# Patient Record
Sex: FEMALE | Race: WHITE
[De-identification: names, ages, dates, MRNs, and addresses within clinical notes are randomized per-mention and may not be internally consistent; named-entity substitution may affect disease eponyms.]

---

## 2017-06-26 ENCOUNTER — HOSPITAL ENCOUNTER (OUTPATIENT)
Dept: HOSPITAL 8 - CFH | Age: 32
Discharge: HOME | End: 2017-06-26
Attending: INTERNAL MEDICINE
Payer: OTHER GOVERNMENT

## 2017-06-26 DIAGNOSIS — C49.21: Primary | ICD-10-CM

## 2017-06-26 PROCEDURE — 71250 CT THORAX DX C-: CPT

## 2017-12-11 ENCOUNTER — HOSPITAL ENCOUNTER (OUTPATIENT)
Dept: HOSPITAL 8 - CFH | Age: 32
Discharge: HOME | End: 2017-12-11
Attending: INTERNAL MEDICINE
Payer: OTHER GOVERNMENT

## 2017-12-11 DIAGNOSIS — C49.21: Primary | ICD-10-CM

## 2017-12-11 PROCEDURE — 71260 CT THORAX DX C+: CPT

## 2018-11-13 ENCOUNTER — HOSPITAL ENCOUNTER (OUTPATIENT)
Dept: HOSPITAL 8 - CFH | Age: 33
Discharge: HOME | End: 2018-11-13
Attending: INTERNAL MEDICINE
Payer: OTHER GOVERNMENT

## 2018-11-13 DIAGNOSIS — G57.81: Primary | ICD-10-CM

## 2018-11-13 PROCEDURE — 73723 MRI JOINT LWR EXTR W/O&W/DYE: CPT

## 2018-11-13 PROCEDURE — A9585 GADOBUTROL INJECTION: HCPCS

## 2018-11-15 ENCOUNTER — HOSPITAL ENCOUNTER (OUTPATIENT)
Dept: HOSPITAL 8 - RAD | Age: 33
Discharge: HOME | End: 2018-11-15
Attending: INTERNAL MEDICINE
Payer: OTHER GOVERNMENT

## 2018-11-15 DIAGNOSIS — C49.21: Primary | ICD-10-CM

## 2018-11-15 PROCEDURE — 71260 CT THORAX DX C+: CPT

## 2019-12-12 ENCOUNTER — INITIAL PRENATAL (OUTPATIENT)
Dept: OBGYN | Facility: CLINIC | Age: 34
End: 2019-12-12
Payer: OTHER GOVERNMENT

## 2019-12-12 DIAGNOSIS — Z34.82 ENCOUNTER FOR SUPERVISION OF OTHER NORMAL PREGNANCY IN SECOND TRIMESTER: ICD-10-CM

## 2019-12-12 DIAGNOSIS — Z85.831 HISTORY OF SARCOMA: ICD-10-CM

## 2019-12-12 DIAGNOSIS — Z3A.21 21 WEEKS GESTATION OF PREGNANCY: ICD-10-CM

## 2019-12-12 PROCEDURE — 59401 PR NEW OB VISIT: CPT | Performed by: OBSTETRICS & GYNECOLOGY

## 2019-12-12 RX ORDER — MULTIVIT WITH MINERALS/LUTEIN
TABLET ORAL
COMMUNITY
End: 2019-12-12

## 2019-12-12 RX ORDER — FEXOFENADINE HCL 180 MG/1
TABLET ORAL
COMMUNITY
End: 2019-12-12

## 2019-12-12 NOTE — PROGRESS NOTES
Cc: New OB visit    Ms. Fong is a 34 y.o.  at 21w4d based upon 10w1d c/w  Patient's last menstrual period was 2019.  Who presents as transfer of care.  Reports she has been doing well and hasn't had any complications this pregnancy.   Has had routine care since 10w1d at which time she had an US.She had first trimester screening, all her labs, second trimester screening, and anatomy ultrasound all of which were normal.  Moved here from [place].   is a [place] and [place] job in [place].  No issues during this pregnancy except severe nausea for which she has been taking reglan which helps.  Denies any other complaints today.  Feels lots of FM, no cramps, VB, LOF.    Dating:early ultrasound    OB History    Para Term  AB Living   2 1 1     1   SAB TAB Ectopic Molar Multiple Live Births             1      # Outcome Date GA Lbr Jorge/2nd Weight Sex Delivery Anes PTL Lv   2 Current            1 Term 18 39w0d  3.175 kg (7 lb) F Vag-Spont      G1 - low PAPPA  So monitored for that.  Went into PTL at 31wks with that pregnancy but made it to 39 wks at which time she was induced for the low PAPPA - baby doing well    Past Medical History:   Diagnosis Date   • Infectious disease    --h/o sarcomas, has routine chest Xrays every 6 months after removal of sarcome in right popliteal fossa in     Past Surgical History:   Procedure Laterality Date   • MASS EXCISION GENERAL Right 2016    Procedure: MASS EXCISION GENERAL of  popliteal darius ;  Surgeon: Aylin Early M.D.;  Location: SURGERY [facility];  Service:    • PB REMOVAL OF TONSILS,<13 Y/O     • MEDIAL PATELLOFEMORAL LIGAMENT       Social History     Socioeconomic History   • Marital status:      Spouse name: Not on file   • Number of children: Not on file   • Years of education: Not on file   • Highest education level: Not on file   Occupational History   • Not on file   Social Needs   • Financial resource  strain: Not on file   • Food insecurity:     Worry: Not on file     Inability: Not on file   • Transportation needs:     Medical: Not on file     Non-medical: Not on file   Tobacco Use   • Smoking status: Never Smoker   • Smokeless tobacco: Never Used   Substance and Sexual Activity   • Alcohol use: No   • Drug use: No   • Sexual activity: Not on file   Lifestyle   • Physical activity:     Days per week: Not on file     Minutes per session: Not on file   • Stress: Not on file   Relationships   • Social connections:     Talks on phone: Not on file     Gets together: Not on file     Attends Gnosticist service: Not on file     Active member of club or organization: Not on file     Attends meetings of clubs or organizations: Not on file     Relationship status: Not on file   • Intimate partner violence:     Fear of current or ex partner: Not on file     Emotionally abused: Not on file     Physically abused: Not on file     Forced sexual activity: Not on file   Other Topics Concern   • Not on file   Social History Narrative   • Not on file     Family History   Problem Relation Age of Onset   • Heart Disease Mother    • No Known Problems Father    • No Known Problems Brother      Allergies:   Allergies as of 2019 - Reviewed 2019   Allergen Reaction Noted   • Acetaminophen Vomiting 2016   • Pcn [penicillins] Rash 2016     PE:    There were no vitals taken for this visit.    General: well developed, well nourished in no apparent distress  Head: normocephalic, atraumatic  Neck: neck is supple, non-tender, no thyromegaly, full range of motion  CVS: 2+ distal pulses, no peripheral edema  Lungs: Normal respiratory effort.   Abdomen: FH at umbilicus, FHT 160s, nondistended, soft, nontender x4, no rebound or guarding.  Skin: No rashes, or ulcers or lesions seen  Psychiatric: appropriate affect, is alert and oriented x3, intact judgment and insight.    A/P:  34 y.o.  @ 21w4d by 10wk US here for her new  obstetric visit as transfer of care    1. 21 weeks gestation of pregnancy     2. Encounter for supervision of other normal pregnancy in second trimester     3. History of sarcoma         #Prenatal care.  Patient was oriented to our obstetric practice, group model, and frequency of visits is discussed.  She is encouraged to continue prenatal vitamin use.  We reviewed safe foods and appropraite exercise during pregnancy.  # Reviewed measures for nausea and vomiting.  Patient happy with reglan use PRN.   #Records release signed today as she has had complete care and so we will obtain these records  --will need CBC/RPR/GTT ordered at next visit   #Close interval pregnancy.  First child will be 18 months at time of this delivery.  Discussed higher risk or PTL.    #NOB packet given  #SAB precautions reviewed.  #Return to clinic in 4 weeks.  KEE

## 2020-01-09 ENCOUNTER — ROUTINE PRENATAL (OUTPATIENT)
Dept: OBGYN | Facility: CLINIC | Age: 35
End: 2020-01-09
Payer: OTHER GOVERNMENT

## 2020-01-09 VITALS — WEIGHT: 181 LBS | SYSTOLIC BLOOD PRESSURE: 128 MMHG | DIASTOLIC BLOOD PRESSURE: 71 MMHG | BODY MASS INDEX: 27.93 KG/M2

## 2020-01-09 DIAGNOSIS — Z34.82 ENCOUNTER FOR SUPERVISION OF OTHER NORMAL PREGNANCY, SECOND TRIMESTER: ICD-10-CM

## 2020-01-09 PROCEDURE — 90040 PR PRENATAL FOLLOW UP: CPT | Performed by: OBSTETRICS & GYNECOLOGY

## 2020-01-09 NOTE — PROGRESS NOTES
S: Pt presents for routine OB follow up.  Patient is doing well today without any complaints.  Reports nausea and vomiting has mostly resolved.  Denies headaches.  No contractions, vaginal bleeding, or leaking fluids.  Reports good fetal movement.    Questions answered.    O: /71   Wt 82.1 kg (181 lb)   LMP 2019   BMI 27.93 kg/m²   Patients' weight gain, fluid intake and exercise level discussed.  Vitals, fundal height , fetal position, and FHR reviewed on flowsheet    Lab: Patient had first and second trimester genetic screening and level 2 ultrasound at [facility]-records are available online and were within normal limits      A/P:  34 y.o.  at 25w4d presents for routine obstetric follow-up.  Patient is doing well currently  Size equals dates     1.  Continue prenatal vitamins.  2.  Begin kick counts at 28 weeks.  3.  Exercise at least 30 minutes daily.  4.  Drink at least 2 Liters of water daily  5.  Follow-up in 3-4 weeks.  6.  Pregnancy precautions and plan of care reviewed  7.  28-week labs discussed-lab slips given

## 2020-01-09 NOTE — PROGRESS NOTES
Pt here today for OB follow up  Pt states doing well  Reports +FM  Good # [phone]  Pharmacy Confirmed.

## 2020-01-14 ENCOUNTER — HOSPITAL ENCOUNTER (OUTPATIENT)
Dept: LAB | Facility: MEDICAL CENTER | Age: 35
End: 2020-01-14
Attending: OBSTETRICS & GYNECOLOGY
Payer: OTHER GOVERNMENT

## 2020-01-14 DIAGNOSIS — Z34.82 ENCOUNTER FOR SUPERVISION OF OTHER NORMAL PREGNANCY, SECOND TRIMESTER: ICD-10-CM

## 2020-01-14 LAB
ERYTHROCYTE [DISTWIDTH] IN BLOOD BY AUTOMATED COUNT: 43.4 FL (ref 35.9–50)
GLUCOSE 1H P 50 G GLC PO SERPL-MCNC: 69 MG/DL (ref 70–139)
HCT VFR BLD AUTO: 38.6 % (ref 37–47)
HGB BLD-MCNC: 12.7 G/DL (ref 12–16)
MCH RBC QN AUTO: 30.5 PG (ref 27–33)
MCHC RBC AUTO-ENTMCNC: 32.9 G/DL (ref 33.6–35)
MCV RBC AUTO: 92.6 FL (ref 81.4–97.8)
PLATELET # BLD AUTO: 275 K/UL (ref 164–446)
PMV BLD AUTO: 10.7 FL (ref 9–12.9)
RBC # BLD AUTO: 4.17 M/UL (ref 4.2–5.4)
TREPONEMA PALLIDUM IGG+IGM AB [PRESENCE] IN SERUM OR PLASMA BY IMMUNOASSAY: NON REACTIVE
WBC # BLD AUTO: 10.8 K/UL (ref 4.8–10.8)

## 2020-01-14 PROCEDURE — 82950 GLUCOSE TEST: CPT

## 2020-01-14 PROCEDURE — 86780 TREPONEMA PALLIDUM: CPT

## 2020-01-14 PROCEDURE — 36415 COLL VENOUS BLD VENIPUNCTURE: CPT

## 2020-01-14 PROCEDURE — 85027 COMPLETE CBC AUTOMATED: CPT

## 2020-01-30 PROBLEM — D64.9 ANEMIA: Status: ACTIVE | Noted: 2018-10-01

## 2020-01-30 PROBLEM — Z85.831 HISTORY OF SARCOMA: Status: ACTIVE | Noted: 2018-03-28

## 2020-01-30 PROBLEM — Z34.92 ENCOUNTER FOR SUPERVISION OF NORMAL PREGNANCY IN SECOND TRIMESTER: Status: ACTIVE | Noted: 2019-09-23

## 2020-02-04 ENCOUNTER — ROUTINE PRENATAL (OUTPATIENT)
Dept: OBGYN | Facility: CLINIC | Age: 35
End: 2020-02-04
Payer: OTHER GOVERNMENT

## 2020-02-04 VITALS — WEIGHT: 185 LBS | DIASTOLIC BLOOD PRESSURE: 59 MMHG | BODY MASS INDEX: 28.98 KG/M2 | SYSTOLIC BLOOD PRESSURE: 113 MMHG

## 2020-02-04 DIAGNOSIS — Z3A.29 29 WEEKS GESTATION OF PREGNANCY: ICD-10-CM

## 2020-02-04 PROCEDURE — 90471 IMMUNIZATION ADMIN: CPT | Performed by: OBSTETRICS & GYNECOLOGY

## 2020-02-04 PROCEDURE — 90040 PR PRENATAL FOLLOW UP: CPT | Performed by: OBSTETRICS & GYNECOLOGY

## 2020-02-04 PROCEDURE — 90715 TDAP VACCINE 7 YRS/> IM: CPT | Performed by: OBSTETRICS & GYNECOLOGY

## 2020-02-04 NOTE — PROGRESS NOTES
Chief complaint: Return visit    S: Pt presents for routine OB follow up. Good fetal movement.  No contractions, vaginal bleeding, or leakage of fluid.    Questions answered.    O: /59   Wt 83.9 kg (185 lb)   LMP 2019   BMI 28.98 kg/m²   Patients' weight gain, fluid intake and exercise level discussed.  Vitals, fundal height , fetal position, and FHR reviewed on flowsheet    Lab:No results found for this or any previous visit (from the past 336 hour(s)).    A/P:  34 y.o.  at 29w2d presents for routine obstetric follow-up.  Size equals dates and/or scan    1.  Continue prenatal vitamins.  2.  Fetal kick counts.  3.  Exercise at least 30 minutes daily.  4.  Drink at least 2L of water daily  5.  Labor precautions educated.  6.  Follow-up in 2 weeks.  7.  GBS at 36 weeks    Labs reviewed.  Normal 1 hour Glucola.    Tdap given today    All questions answered

## 2020-02-04 NOTE — PROGRESS NOTES
Pt here today for OB follow up  Pt states a few contractions here and there   Pt denies VB, LOF, and cramping   Reports +FM  Good # [phone]  Pharmacy Confirmed.  Chaperone offered and provided.

## 2020-02-04 NOTE — LETTER
"Count Your Baby's Movements  Another step to a healthy delivery    Tiffanie Fong              Dept: [phone]    How Many Weeks Pregnant? 29w2d    Date to Begin Countin2020              How to use this chart    One way for your physician to keep track of your baby's health is by knowing how often the baby moves (or \"kicks\") in your womb.  You can help your physician to do this by using this chart every day.    Every day, you should see how many hours it takes for your baby to move 10 times.  Start in the morning, as soon as you get up.    · First, write down the time your baby moves until you get to 10.  · Check off one box every time your baby moves until you get to 10.  · Write down the time you finished counting in the last column.  · Total how long it took to count up all 10 movements.  · Finally, fill in the box that shows how long this took.  After counting 10 movements, you no longer have to count any more that day.  The next morning, just start counting again as soon as you get up.    What should you call a \"movement\"?  It is hard to say, because it will feel different from one mother to another and from one pregnancy to the next.  The important thing is that you count the movements the same way throughout your pregnancy.  If you have more questions, you should ask your physician.    Count carefully every day!  SAMPLE:  Week 28    How many hours did it take to feel 10 movements?       Start  Time     1     2     3     4     5     6     7     8     9     10   Finish Time   Mon 8:20 ·  ·  ·  ·  ·  ·  ·  ·  ·  ·  11:40                  Sat               Sun                 IMPORTANT: You should contact your physician if it takes more than two hours for you to feel 10 movements.  Each morning, write down the time and start to count the movements of your baby.  Keep track by checking off one box every time you feel one movement.  When you have " "felt 10 \"kicks\", write down the time you finished counting in the last column.  Then fill in the   box (over the check linda) for the number of hours it took.  Be sure to read the complete instructions on the previous page.            "

## 2020-02-21 ENCOUNTER — ROUTINE PRENATAL (OUTPATIENT)
Dept: OBGYN | Facility: CLINIC | Age: 35
End: 2020-02-21
Payer: OTHER GOVERNMENT

## 2020-02-21 VITALS — DIASTOLIC BLOOD PRESSURE: 75 MMHG | SYSTOLIC BLOOD PRESSURE: 131 MMHG | WEIGHT: 187 LBS | BODY MASS INDEX: 29.29 KG/M2

## 2020-02-21 DIAGNOSIS — C47.9: ICD-10-CM

## 2020-02-21 DIAGNOSIS — Z34.83 ENCOUNTER FOR SUPERVISION OF OTHER NORMAL PREGNANCY, THIRD TRIMESTER: ICD-10-CM

## 2020-02-21 DIAGNOSIS — Z85.831 HISTORY OF SARCOMA: ICD-10-CM

## 2020-02-21 DIAGNOSIS — D50.8 OTHER IRON DEFICIENCY ANEMIA: ICD-10-CM

## 2020-02-21 PROCEDURE — 90040 PR PRENATAL FOLLOW UP: CPT | Performed by: OBSTETRICS & GYNECOLOGY

## 2020-02-21 NOTE — PROGRESS NOTES
Pt here today for OB follow up  Pt states no complaints   Reports +FM  Good # confirmed w/ pt   Pharmacy Confirmed.

## 2020-02-21 NOTE — PROGRESS NOTES
S: Pt presents for routine OB follow up. Reports normal fetal movements. Denies headaches  No contractions, vaginal bleeding, or leakage of fluid.    Questions answered.    O: LMP 2019   Patients' weight gain, fluid intake and exercise level discussed.  Vitals, fundal height , fetal position, and FHR reviewed on flowsheet    Lab:No results found for this or any previous visit (from the past 336 hour(s)).    A/P:  34 y.o.  at 31w5d presents for routine obstetric follow-up.Doing well.  Size equals dates   Encounter Diagnoses   Name Primary?   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  Continue prenatal vitamins.  2.  Fetal kick counts daily discussed.  3.  Exercise at least 30 minutes daily.  4.  Drink at least 2L of water daily  5.  Pregnancy and  labor precautions educated.  6.  Follow-up in 2 weeks.  7.  GBS at 36 wks

## 2020-03-02 ENCOUNTER — ROUTINE PRENATAL (OUTPATIENT)
Dept: OBGYN | Facility: CLINIC | Age: 35
End: 2020-03-02
Payer: OTHER GOVERNMENT

## 2020-03-02 VITALS — SYSTOLIC BLOOD PRESSURE: 121 MMHG | WEIGHT: 191 LBS | BODY MASS INDEX: 29.91 KG/M2 | DIASTOLIC BLOOD PRESSURE: 75 MMHG

## 2020-03-02 DIAGNOSIS — Z3A.33 33 WEEKS GESTATION OF PREGNANCY: ICD-10-CM

## 2020-03-02 PROCEDURE — 90040 PR PRENATAL FOLLOW UP: CPT | Performed by: OBSTETRICS & GYNECOLOGY

## 2020-03-02 RX ORDER — ONDANSETRON 4 MG/1
4 TABLET, FILM COATED ORAL EVERY 4 HOURS PRN
Qty: 30 TAB | Refills: 2 | Status: ON HOLD
Start: 2020-03-02 | End: 2020-04-15

## 2020-03-02 NOTE — PROGRESS NOTES
Chief complaint: Return OB visit    S: Pt presents for routine OB follow up. Good fetal movement.  No contractions, vaginal bleeding, or leakage of fluid.    Questions answered.    Patient reports some nausea for the last few days.  No fevers.  Would like some medication if possible to control her symptoms and allow her to work    O: /75   Wt 86.6 kg (191 lb)   LMP 2019   BMI 29.91 kg/m²   Patients' weight gain, fluid intake and exercise level discussed.  Vitals, fundal height , fetal position, and FHR reviewed on flowsheet    Lab:No results found for this or any previous visit (from the past 336 hour(s)).    A/P:  34 y.o.  at 33w1d presents for routine obstetric follow-up.  Size equals dates and/or scan    1.  Continue prenatal vitamins.  2.  Fetal kick counts.  3.  Exercise at least 30 minutes daily.  4.  Drink at least 2L of water daily  5.  Labor precautions educated.  6.  Follow-up in 2 weeks.  7.  GBS at 36 weeks    Prescription for Zofran sent to the patient's pharmacy.    All questions answered

## 2020-03-02 NOTE — PROGRESS NOTES
Pt here today for OB follow up @ 33w1d  Pt states denies VB and LOF  Reports +FM  Good # [phone]   Pharmacy Confirmed.

## 2020-03-17 ENCOUNTER — ROUTINE PRENATAL (OUTPATIENT)
Dept: OBGYN | Facility: CLINIC | Age: 35
End: 2020-03-17
Payer: OTHER GOVERNMENT

## 2020-03-17 VITALS — DIASTOLIC BLOOD PRESSURE: 64 MMHG | SYSTOLIC BLOOD PRESSURE: 107 MMHG | BODY MASS INDEX: 30.38 KG/M2 | WEIGHT: 194 LBS

## 2020-03-17 DIAGNOSIS — Z34.83 ENCOUNTER FOR SUPERVISION OF OTHER NORMAL PREGNANCY, THIRD TRIMESTER: ICD-10-CM

## 2020-03-17 PROCEDURE — 90040 PR PRENATAL FOLLOW UP: CPT | Performed by: OBSTETRICS & GYNECOLOGY

## 2020-03-17 NOTE — PROGRESS NOTES
S: Pt presents for routine OB follow up.  No contractions, vaginal bleeding, or leaking fluids. Good fetal movement.    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  at 35w2d presents for routine obstetric follow-up.     #Prenatal care  - Continue prenatal vitamins.  -  labor precautions and fetal kick counts at 28 weeks.  Transfer of care from [place], [place]  -h/o sarcomas, has routine chest Xrays every 6 months after removal of sarcoma in right popliteal fossa in 2016. Seeing Dr. Thorpe in [place]. Planning CT scan end of April postpartum   CARLOS by 10wk US. Neg first TM screen. PNP wnl. Anatomy scan wnl. 3rd TM labs and 1hr gct wnl.     Desires IOL at 39 wks  - next week GBS    - Follow-up in 1 weeks to discuss scheduling IOL options at that time.

## 2020-03-17 NOTE — PROGRESS NOTES
.Pt here today for OB follow up  Pt states she has irregular contraction  Reports +FM  Pharmacy Confirmed.  Chaperone offered and declined.

## 2020-03-26 ENCOUNTER — HOSPITAL ENCOUNTER (OUTPATIENT)
Facility: MEDICAL CENTER | Age: 35
End: 2020-03-26
Attending: OBSTETRICS & GYNECOLOGY
Payer: OTHER GOVERNMENT

## 2020-03-26 ENCOUNTER — ROUTINE PRENATAL (OUTPATIENT)
Dept: OBGYN | Facility: CLINIC | Age: 35
End: 2020-03-26
Payer: OTHER GOVERNMENT

## 2020-03-26 VITALS — BODY MASS INDEX: 30.54 KG/M2 | WEIGHT: 195 LBS | DIASTOLIC BLOOD PRESSURE: 66 MMHG | SYSTOLIC BLOOD PRESSURE: 109 MMHG

## 2020-03-26 DIAGNOSIS — Z34.93 THIRD TRIMESTER PREGNANCY: ICD-10-CM

## 2020-03-26 PROCEDURE — 87150 DNA/RNA AMPLIFIED PROBE: CPT

## 2020-03-26 PROCEDURE — 90040 PR PRENATAL FOLLOW UP: CPT | Performed by: OBSTETRICS & GYNECOLOGY

## 2020-03-26 PROCEDURE — 87081 CULTURE SCREEN ONLY: CPT

## 2020-03-26 NOTE — PROGRESS NOTES
S: Pt presents for routine OB follow up. Good fetal movement.  No vaginal bleeding, or leakage of fluid.  Occasional contractions, nothing too regular.   Questions answered.    O: /66   Wt 88.5 kg (195 lb)   LMP 2019   BMI 30.54 kg/m²   Patients' weight gain, fluid intake and exercise level discussed.  Vitals, fundal height , fetal position, and FHR reviewed on flowsheet    Lab:No results found for this or any previous visit (from the past 336 hour(s)).    A/P:  34 y.o.  at 36w4d presents for routine obstetric follow-up.  Size equals dates and/or scan    1.  Continue prenatal vitamins.  2.  Fetal kick counts.  3.  Exercise at least 30 minutes daily.  4.  Drink at least 2L of water daily  5.  Labor precautions educated.  6.  Follow-up in 1 weeks.  7.  GBS done today  8.  Transfer of care from [place], [place]  -h/o sarcomas, has routine chest Xrays every 6 months after removal of sarcoma in right popliteal fossa in . Seeing Dr. Thorpe in [place]. Planning CT scan end of April postpartum   CARLOS by 10wk US. Neg first TM screen. PNP wnl. Anatomy scan wnl. 3rd TM labs and 1hr gct wnl.   9.  Order for induction at 39 weeks placed, discussed various means of methods for inductions. 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 States she wants an epidural.

## 2020-03-26 NOTE — PROGRESS NOTES
Pt here today for OB follow up @ 36w4d  Pt states denies VB and LOF  Reports +FM  Good # [phone]  Pharmacy Confirmed.  Chaperone offered and provided.  GBS Today

## 2020-03-27 LAB — GP B STREP DNA SPEC QL NAA+PROBE: NEGATIVE

## 2020-04-02 ENCOUNTER — ROUTINE PRENATAL (OUTPATIENT)
Dept: OBGYN | Facility: CLINIC | Age: 35
End: 2020-04-02
Payer: OTHER GOVERNMENT

## 2020-04-02 VITALS — SYSTOLIC BLOOD PRESSURE: 117 MMHG | DIASTOLIC BLOOD PRESSURE: 74 MMHG | BODY MASS INDEX: 30.85 KG/M2 | WEIGHT: 197 LBS

## 2020-04-02 DIAGNOSIS — O26.843 SIZE OF FETUS INCONSISTENT WITH DATES IN THIRD TRIMESTER: ICD-10-CM

## 2020-04-02 DIAGNOSIS — Z34.83 ENCOUNTER FOR SUPERVISION OF OTHER NORMAL PREGNANCY IN THIRD TRIMESTER: ICD-10-CM

## 2020-04-02 DIAGNOSIS — Z3A.37 37 WEEKS GESTATION OF PREGNANCY: ICD-10-CM

## 2020-04-02 PROCEDURE — 90040 PR PRENATAL FOLLOW UP: CPT | Performed by: OBSTETRICS & GYNECOLOGY

## 2020-04-02 NOTE — PROGRESS NOTES
Pt here today for OB follow up @ 37w4d  Pt states denies VB and LOF  Reports +FM  Good # [phone]  Pharmacy Confirmed.

## 2020-04-06 ENCOUNTER — HOSPITAL ENCOUNTER (OUTPATIENT)
Dept: RADIOLOGY | Facility: MEDICAL CENTER | Age: 35
End: 2020-04-06
Attending: OBSTETRICS & GYNECOLOGY
Payer: OTHER GOVERNMENT

## 2020-04-06 DIAGNOSIS — O26.843 SIZE OF FETUS INCONSISTENT WITH DATES IN THIRD TRIMESTER: ICD-10-CM

## 2020-04-06 PROCEDURE — 76816 OB US FOLLOW-UP PER FETUS: CPT

## 2020-04-10 ENCOUNTER — ROUTINE PRENATAL (OUTPATIENT)
Dept: OBGYN | Facility: CLINIC | Age: 35
End: 2020-04-10
Payer: OTHER GOVERNMENT

## 2020-04-10 VITALS — SYSTOLIC BLOOD PRESSURE: 110 MMHG | DIASTOLIC BLOOD PRESSURE: 68 MMHG | WEIGHT: 200 LBS | BODY MASS INDEX: 31.32 KG/M2

## 2020-04-10 DIAGNOSIS — Z34.93 THIRD TRIMESTER PREGNANCY: ICD-10-CM

## 2020-04-10 PROCEDURE — 90040 PR PRENATAL FOLLOW UP: CPT | Performed by: OBSTETRICS & GYNECOLOGY

## 2020-04-10 NOTE — PROGRESS NOTES
S: Pt presents for routine OB follow up. Good fetal movement.  No contractions, vaginal bleeding, or leakage of fluid.    Questions answered.    O: LMP 2019   Patients' weight gain, fluid intake and exercise level discussed.  Vitals, fundal height , fetal position, and FHR reviewed on flowsheet    Lab:No results found for this or any previous visit (from the past 336 hour(s)).    A/P:  34 y.o.  at 38w5d presents for routine obstetric follow-up.  Size equals dates and/or scan    1.  Continue prenatal vitamins.  2.  Fetal kick counts.  3.  Exercise at least 30 minutes daily.  4.  Drink at least 2L of water daily  5.  Labor precautions educated.  6.  Follow-up for IOL  7.  GBS negative  8.  IOL -cervix /hammad high.   9.  Transfer of care from [place], [place]  -h/o sarcomas, has routine chest Xrays every 6 months after removal of sarcoma in right popliteal fossa in 2016. Seeing Dr. Thorpe in [place]. Planning CT scan end of April postpartum   10.  Growth scan for size less than dates performed on 20 demonstrated shower humerus and femur length. AFP negative.

## 2020-04-10 NOTE — PROGRESS NOTES
Pt here today for OB follow up  Pt states no complaints  Reports +FM  Pharmacy Confirmed.  Chaperone offered and declined.

## 2020-04-13 ENCOUNTER — ANESTHESIA (OUTPATIENT)
Dept: ANESTHESIOLOGY | Facility: MEDICAL CENTER | Age: 35
End: 2020-04-13
Payer: OTHER GOVERNMENT

## 2020-04-13 ENCOUNTER — HOSPITAL ENCOUNTER (INPATIENT)
Facility: MEDICAL CENTER | Age: 35
LOS: 2 days | End: 2020-04-15
Attending: OBSTETRICS & GYNECOLOGY | Admitting: OBSTETRICS & GYNECOLOGY
Payer: OTHER GOVERNMENT

## 2020-04-13 ENCOUNTER — APPOINTMENT (OUTPATIENT)
Dept: OBGYN | Facility: MEDICAL CENTER | Age: 35
End: 2020-04-13
Attending: OBSTETRICS & GYNECOLOGY
Payer: OTHER GOVERNMENT

## 2020-04-13 ENCOUNTER — ANESTHESIA EVENT (OUTPATIENT)
Dept: ANESTHESIOLOGY | Facility: MEDICAL CENTER | Age: 35
End: 2020-04-13
Payer: OTHER GOVERNMENT

## 2020-04-13 LAB
BASOPHILS # BLD AUTO: 0.2 % (ref 0–1.8)
BASOPHILS # BLD: 0.02 K/UL (ref 0–0.12)
EOSINOPHIL # BLD AUTO: 0.13 K/UL (ref 0–0.51)
EOSINOPHIL NFR BLD: 1.3 % (ref 0–6.9)
ERYTHROCYTE [DISTWIDTH] IN BLOOD BY AUTOMATED COUNT: 41.6 FL (ref 35.9–50)
HCT VFR BLD AUTO: 34.6 % (ref 37–47)
HGB BLD-MCNC: 11.5 G/DL (ref 12–16)
HOLDING TUBE BB 8507: NORMAL
IMM GRANULOCYTES # BLD AUTO: 0.08 K/UL (ref 0–0.11)
IMM GRANULOCYTES NFR BLD AUTO: 0.8 % (ref 0–0.9)
LYMPHOCYTES # BLD AUTO: 1.55 K/UL (ref 1–4.8)
LYMPHOCYTES NFR BLD: 15.2 % (ref 22–41)
MCH RBC QN AUTO: 28.8 PG (ref 27–33)
MCHC RBC AUTO-ENTMCNC: 33.2 G/DL (ref 33.6–35)
MCV RBC AUTO: 86.7 FL (ref 81.4–97.8)
MONOCYTES # BLD AUTO: 0.7 K/UL (ref 0–0.85)
MONOCYTES NFR BLD AUTO: 6.9 % (ref 0–13.4)
NEUTROPHILS # BLD AUTO: 7.72 K/UL (ref 2–7.15)
NEUTROPHILS NFR BLD: 75.6 % (ref 44–72)
NRBC # BLD AUTO: 0 K/UL
NRBC BLD-RTO: 0 /100 WBC
PLATELET # BLD AUTO: 272 K/UL (ref 164–446)
PMV BLD AUTO: 10.3 FL (ref 9–12.9)
RBC # BLD AUTO: 3.99 M/UL (ref 4.2–5.4)
WBC # BLD AUTO: 10.2 K/UL (ref 4.8–10.8)

## 2020-04-13 PROCEDURE — 700105 HCHG RX REV CODE 258: Performed by: OBSTETRICS & GYNECOLOGY

## 2020-04-13 PROCEDURE — 700102 HCHG RX REV CODE 250 W/ 637 OVERRIDE(OP): Performed by: OBSTETRICS & GYNECOLOGY

## 2020-04-13 PROCEDURE — 700111 HCHG RX REV CODE 636 W/ 250 OVERRIDE (IP)

## 2020-04-13 PROCEDURE — 10H07YZ INSERTION OF OTHER DEVICE INTO PRODUCTS OF CONCEPTION, VIA NATURAL OR ARTIFICIAL OPENING: ICD-10-PCS | Performed by: OBSTETRICS & GYNECOLOGY

## 2020-04-13 PROCEDURE — 36415 COLL VENOUS BLD VENIPUNCTURE: CPT

## 2020-04-13 PROCEDURE — A9270 NON-COVERED ITEM OR SERVICE: HCPCS | Performed by: OBSTETRICS & GYNECOLOGY

## 2020-04-13 PROCEDURE — 700111 HCHG RX REV CODE 636 W/ 250 OVERRIDE (IP): Performed by: ANESTHESIOLOGY

## 2020-04-13 PROCEDURE — 3E033VJ INTRODUCTION OF OTHER HORMONE INTO PERIPHERAL VEIN, PERCUTANEOUS APPROACH: ICD-10-PCS | Performed by: OBSTETRICS & GYNECOLOGY

## 2020-04-13 PROCEDURE — 700105 HCHG RX REV CODE 258

## 2020-04-13 PROCEDURE — 85025 COMPLETE CBC W/AUTO DIFF WBC: CPT

## 2020-04-13 PROCEDURE — 700111 HCHG RX REV CODE 636 W/ 250 OVERRIDE (IP): Performed by: OBSTETRICS & GYNECOLOGY

## 2020-04-13 PROCEDURE — 3E0P7VZ INTRODUCTION OF HORMONE INTO FEMALE REPRODUCTIVE, VIA NATURAL OR ARTIFICIAL OPENING: ICD-10-PCS | Performed by: OBSTETRICS & GYNECOLOGY

## 2020-04-13 PROCEDURE — 770002 HCHG ROOM/CARE - OB PRIVATE (112)

## 2020-04-13 RX ORDER — ROPIVACAINE HYDROCHLORIDE 2 MG/ML
INJECTION, SOLUTION EPIDURAL; INFILTRATION; PERINEURAL
Status: COMPLETED
Start: 2020-04-13 | End: 2020-04-13

## 2020-04-13 RX ORDER — IBUPROFEN 600 MG/1
600 TABLET ORAL EVERY 6 HOURS PRN
Status: DISCONTINUED | OUTPATIENT
Start: 2020-04-13 | End: 2020-04-15 | Stop reason: HOSPADM

## 2020-04-13 RX ORDER — DEXTROSE, SODIUM CHLORIDE, SODIUM LACTATE, POTASSIUM CHLORIDE, AND CALCIUM CHLORIDE 5; .6; .31; .03; .02 G/100ML; G/100ML; G/100ML; G/100ML; G/100ML
INJECTION, SOLUTION INTRAVENOUS
Status: COMPLETED
Start: 2020-04-13 | End: 2020-04-14

## 2020-04-13 RX ORDER — ROPIVACAINE HYDROCHLORIDE 2 MG/ML
INJECTION, SOLUTION EPIDURAL; INFILTRATION; PERINEURAL CONTINUOUS
Status: DISCONTINUED | OUTPATIENT
Start: 2020-04-13 | End: 2020-04-14

## 2020-04-13 RX ORDER — OXYCODONE HYDROCHLORIDE AND ACETAMINOPHEN 5; 325 MG/1; MG/1
1 TABLET ORAL EVERY 4 HOURS PRN
Status: DISCONTINUED | OUTPATIENT
Start: 2020-04-13 | End: 2020-04-15 | Stop reason: HOSPADM

## 2020-04-13 RX ORDER — ONDANSETRON 2 MG/ML
4 INJECTION INTRAMUSCULAR; INTRAVENOUS EVERY 6 HOURS PRN
Status: DISCONTINUED | OUTPATIENT
Start: 2020-04-13 | End: 2020-04-15 | Stop reason: HOSPADM

## 2020-04-13 RX ORDER — OXYCODONE HYDROCHLORIDE 10 MG/1
10 TABLET ORAL EVERY 4 HOURS PRN
Status: DISCONTINUED | OUTPATIENT
Start: 2020-04-13 | End: 2020-04-15 | Stop reason: HOSPADM

## 2020-04-13 RX ORDER — SODIUM CHLORIDE, SODIUM LACTATE, POTASSIUM CHLORIDE, AND CALCIUM CHLORIDE .6; .31; .03; .02 G/100ML; G/100ML; G/100ML; G/100ML
1000 INJECTION, SOLUTION INTRAVENOUS
Status: DISCONTINUED | OUTPATIENT
Start: 2020-04-13 | End: 2020-04-14 | Stop reason: HOSPADM

## 2020-04-13 RX ORDER — SODIUM CHLORIDE, SODIUM LACTATE, POTASSIUM CHLORIDE, AND CALCIUM CHLORIDE .6; .31; .03; .02 G/100ML; G/100ML; G/100ML; G/100ML
250 INJECTION, SOLUTION INTRAVENOUS PRN
Status: DISCONTINUED | OUTPATIENT
Start: 2020-04-13 | End: 2020-04-14 | Stop reason: HOSPADM

## 2020-04-13 RX ORDER — ONDANSETRON 4 MG/1
4 TABLET, ORALLY DISINTEGRATING ORAL EVERY 6 HOURS PRN
Status: DISCONTINUED | OUTPATIENT
Start: 2020-04-13 | End: 2020-04-15 | Stop reason: HOSPADM

## 2020-04-13 RX ORDER — MISOPROSTOL 200 UG/1
800 TABLET ORAL
Status: DISCONTINUED | OUTPATIENT
Start: 2020-04-13 | End: 2020-04-14 | Stop reason: HOSPADM

## 2020-04-13 RX ORDER — METHYLERGONOVINE MALEATE 0.2 MG/ML
0.2 INJECTION INTRAVENOUS
Status: DISCONTINUED | OUTPATIENT
Start: 2020-04-13 | End: 2020-04-14 | Stop reason: HOSPADM

## 2020-04-13 RX ORDER — ALUMINA, MAGNESIA, AND SIMETHICONE 2400; 2400; 240 MG/30ML; MG/30ML; MG/30ML
30 SUSPENSION ORAL EVERY 6 HOURS PRN
Status: DISCONTINUED | OUTPATIENT
Start: 2020-04-13 | End: 2020-04-14 | Stop reason: HOSPADM

## 2020-04-13 RX ORDER — ROPIVACAINE HYDROCHLORIDE 2 MG/ML
INJECTION, SOLUTION EPIDURAL; INFILTRATION PRN
Status: DISCONTINUED | OUTPATIENT
Start: 2020-04-13 | End: 2020-04-14 | Stop reason: SURG

## 2020-04-13 RX ORDER — ACETAMINOPHEN 325 MG/1
325 TABLET ORAL EVERY 4 HOURS PRN
Status: DISCONTINUED | OUTPATIENT
Start: 2020-04-13 | End: 2020-04-15 | Stop reason: HOSPADM

## 2020-04-13 RX ORDER — SODIUM CHLORIDE, SODIUM LACTATE, POTASSIUM CHLORIDE, CALCIUM CHLORIDE 600; 310; 30; 20 MG/100ML; MG/100ML; MG/100ML; MG/100ML
INJECTION, SOLUTION INTRAVENOUS CONTINUOUS
Status: DISPENSED | OUTPATIENT
Start: 2020-04-13 | End: 2020-04-13

## 2020-04-13 RX ADMIN — SODIUM CHLORIDE, SODIUM LACTATE, POTASSIUM CHLORIDE, CALCIUM CHLORIDE AND DEXTROSE MONOHYDRATE 1000 ML: 5; 600; 310; 30; 20 INJECTION, SOLUTION INTRAVENOUS at 23:22

## 2020-04-13 RX ADMIN — SODIUM CHLORIDE, POTASSIUM CHLORIDE, SODIUM LACTATE AND CALCIUM CHLORIDE: 600; 310; 30; 20 INJECTION, SOLUTION INTRAVENOUS at 15:22

## 2020-04-13 RX ADMIN — FENTANYL CITRATE 25 MCG: 50 INJECTION, SOLUTION INTRAMUSCULAR; INTRAVENOUS at 17:51

## 2020-04-13 RX ADMIN — ROPIVACAINE HYDROCHLORIDE: 2 INJECTION, SOLUTION EPIDURAL; INFILTRATION at 18:09

## 2020-04-13 RX ADMIN — SODIUM CHLORIDE, POTASSIUM CHLORIDE, SODIUM LACTATE AND CALCIUM CHLORIDE: 600; 310; 30; 20 INJECTION, SOLUTION INTRAVENOUS at 18:08

## 2020-04-13 RX ADMIN — ROPIVACAINE HYDROCHLORIDE 5 ML: 2 INJECTION, SOLUTION EPIDURAL; INFILTRATION at 17:56

## 2020-04-13 RX ADMIN — FENTANYL CITRATE 25 MCG: 50 INJECTION, SOLUTION INTRAMUSCULAR; INTRAVENOUS at 17:56

## 2020-04-13 RX ADMIN — ONDANSETRON 4 MG: 2 INJECTION INTRAMUSCULAR; INTRAVENOUS at 23:18

## 2020-04-13 RX ADMIN — DINOPROSTONE 0.5 MG: 0.5 GEL VAGINAL at 10:59

## 2020-04-13 RX ADMIN — ROPIVACAINE HYDROCHLORIDE 5 ML: 2 INJECTION, SOLUTION EPIDURAL; INFILTRATION at 17:51

## 2020-04-13 RX ADMIN — OXYTOCIN 2 MILLI-UNITS/MIN: 10 INJECTION INTRAVENOUS at 15:22

## 2020-04-13 SDOH — ECONOMIC STABILITY: TRANSPORTATION INSECURITY
IN THE PAST 12 MONTHS, HAS THE LACK OF TRANSPORTATION KEPT YOU FROM MEDICAL APPOINTMENTS OR FROM GETTING MEDICATIONS?: NO

## 2020-04-13 SDOH — ECONOMIC STABILITY: TRANSPORTATION INSECURITY
IN THE PAST 12 MONTHS, HAS LACK OF TRANSPORTATION KEPT YOU FROM MEETINGS, WORK, OR FROM GETTING THINGS NEEDED FOR DAILY LIVING?: NO

## 2020-04-13 SDOH — ECONOMIC STABILITY: FOOD INSECURITY: WITHIN THE PAST 12 MONTHS, YOU WORRIED THAT YOUR FOOD WOULD RUN OUT BEFORE YOU GOT MONEY TO BUY MORE.: NEVER TRUE

## 2020-04-13 SDOH — ECONOMIC STABILITY: FOOD INSECURITY: WITHIN THE PAST 12 MONTHS, THE FOOD YOU BOUGHT JUST DIDN'T LAST AND YOU DIDN'T HAVE MONEY TO GET MORE.: NEVER TRUE

## 2020-04-13 ASSESSMENT — PATIENT HEALTH QUESTIONNAIRE - PHQ9
2. FEELING DOWN, DEPRESSED, IRRITABLE, OR HOPELESS: NOT AT ALL
SUM OF ALL RESPONSES TO PHQ9 QUESTIONS 1 AND 2: 0
1. LITTLE INTEREST OR PLEASURE IN DOING THINGS: NOT AT ALL

## 2020-04-13 ASSESSMENT — LIFESTYLE VARIABLES
ALCOHOL_USE: NO
EVER_SMOKED: NEVER

## 2020-04-13 NOTE — H&P
History and Physical;      Tiffanie Fong is a 34 y.o. year old female  at 39w1d who presents for elective induction of labor    Subjective:   negative  For CTXS.   negative Feels pain   negative for LOF  negative for vaginal bleeding.   positive for fetal movement    ROS: Pertinent items are noted in HPI.    Past Medical History:   Diagnosis Date   • Infectious disease      Past Surgical History:   Procedure Laterality Date   • MASS EXCISION GENERAL Right 2016    Procedure: MASS EXCISION GENERAL of  popliteal darius ;  Surgeon: Aylin Early M.D.;  Location: SURGERY [facility];  Service:    • PB REMOVAL OF TONSILS,<13 Y/O     • MEDIAL PATELLOFEMORAL LIGAMENT       OB History    Para Term  AB Living   2 1 1     1   SAB TAB Ectopic Molar Multiple Live Births             1      # Outcome Date GA Lbr Jorge/2nd Weight Sex Delivery Anes PTL Lv   2 Current            1 Term 18 39w0d  3.175 kg (7 lb) F Vag-Spont        Social History     Socioeconomic History   • Marital status:      Spouse name: Not on file   • Number of children: Not on file   • Years of education: Not on file   • Highest education level: Not on file   Occupational History   • Not on file   Social Needs   • Financial resource strain: Not on file   • Food insecurity     Worry: Not on file     Inability: Not on file   • Transportation needs     Medical: Not on file     Non-medical: Not on file   Tobacco Use   • Smoking status: Never Smoker   • Smokeless tobacco: Never Used   Substance and Sexual Activity   • Alcohol use: No   • Drug use: No   • Sexual activity: Not on file   Lifestyle   • Physical activity     Days per week: Not on file     Minutes per session: Not on file   • Stress: Not on file   Relationships   • Social connections     Talks on phone: Not on file     Gets together: Not on file     Attends Mosque service: Not on file     Active member of club or organization: Not on file      "Attends meetings of clubs or organizations: Not on file     Relationship status: Not on file   • Intimate partner violence     Fear of current or ex partner: Not on file     Emotionally abused: Not on file     Physically abused: Not on file     Forced sexual activity: Not on file   Other Topics Concern   • Not on file   Social History Narrative   • Not on file     Allergies: Acetaminophen and Pcn [penicillins]  No current facility-administered medications for this encounter.     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 Bimanual exam reveals normal uterus and adnexa., proven to 3175 g   FHTs:  135 BPM good accels no decels good variability   Contractions:  0 contractions in 10 min soft to palpation   Uterine Size: S=D   Presentations: Cephalic per RN   Cervix: Per RN    Dilation: 1cm    Effacement:  40%    Station:  -3    Consistency: Medium    Position: Anterior     Complete OB US  See ultrasound results    Lab Review  Lab:   Blood type: See lab test results    Recent Results (from the past 5880 hour(s))   T.PALLIDUM AB EIA    Collection Time: 01/14/20 12:50 " PM   Result Value Ref Range    Syphilis, Treponemal Qual Non Reactive Non Reactive   CBC WITHOUT DIFFERENTIAL    Collection Time: 01/14/20 12:50 PM   Result Value Ref Range    WBC 10.8 4.8 - 10.8 K/uL    RBC 4.17 (L) 4.20 - 5.40 M/uL    Hemoglobin 12.7 12.0 - 16.0 g/dL    Hematocrit 38.6 37.0 - 47.0 %    MCV 92.6 81.4 - 97.8 fL    MCH 30.5 27.0 - 33.0 pg    MCHC 32.9 (L) 33.6 - 35.0 g/dL    RDW 43.4 35.9 - 50.0 fL    Platelet Count 275 164 - 446 K/uL    MPV 10.7 9.0 - 12.9 fL   GLUCOSE 1HR GESTATIONAL    Collection Time: 01/14/20 12:50 PM   Result Value Ref Range    Glucose, Post Dose 69 (L) 70 - 139 mg/dL   GRP B STREP, BY PCR (RAMOS BROTH)    Collection Time: 03/26/20 11:15 AM   Result Value Ref Range    Strep Gp B DNA PCR Negative Negative        Assessment:   1.  IUP at 39w1d  2.  Labor status: Not in labor.  3.  Cat 1 FHTs  4.  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      Plan:     Admit to L&D  GBS negative  Routine labs  Pain management prn  Elective induction of labor-prostaglandin gel cervical ripening to start

## 2020-04-13 NOTE — PROGRESS NOTES
EDC  39      0945-pt presents from home for scheduled elective IOL, no c/o leaking, bleeding, pain or uc's, states baby is moving normally, placed on external monitors, vs taken, SVE /-3  1000-TC Dr Jhaveri, report given, wants gel  1010-iv started, labs drawn and sent  1100-gel placed, admission completed  1515-SVE 3/50/-3, TC Dr Jhaveri, report given, pitocin orders received  1530-pitocin started, pt and FOB educated, verbalized understanding  -report given to ROBERTO CARLOS Jennings RN, POC discussed

## 2020-04-14 LAB
ERYTHROCYTE [DISTWIDTH] IN BLOOD BY AUTOMATED COUNT: 42.1 FL (ref 35.9–50)
HCT VFR BLD AUTO: 30.2 % (ref 37–47)
HGB BLD-MCNC: 10.1 G/DL (ref 12–16)
MCH RBC QN AUTO: 28.8 PG (ref 27–33)
MCHC RBC AUTO-ENTMCNC: 33.4 G/DL (ref 33.6–35)
MCV RBC AUTO: 86 FL (ref 81.4–97.8)
PLATELET # BLD AUTO: 237 K/UL (ref 164–446)
PMV BLD AUTO: 10.4 FL (ref 9–12.9)
RBC # BLD AUTO: 3.51 M/UL (ref 4.2–5.4)
WBC # BLD AUTO: 12.3 K/UL (ref 4.8–10.8)

## 2020-04-14 PROCEDURE — 85027 COMPLETE CBC AUTOMATED: CPT

## 2020-04-14 PROCEDURE — 36415 COLL VENOUS BLD VENIPUNCTURE: CPT

## 2020-04-14 PROCEDURE — 700112 HCHG RX REV CODE 229: Performed by: NURSE PRACTITIONER

## 2020-04-14 PROCEDURE — 10907ZC DRAINAGE OF AMNIOTIC FLUID, THERAPEUTIC FROM PRODUCTS OF CONCEPTION, VIA NATURAL OR ARTIFICIAL OPENING: ICD-10-PCS | Performed by: OBSTETRICS & GYNECOLOGY

## 2020-04-14 PROCEDURE — 59409 OBSTETRICAL CARE: CPT

## 2020-04-14 PROCEDURE — 59400 OBSTETRICAL CARE: CPT | Performed by: NURSE PRACTITIONER

## 2020-04-14 PROCEDURE — 700102 HCHG RX REV CODE 250 W/ 637 OVERRIDE(OP): Performed by: NURSE PRACTITIONER

## 2020-04-14 PROCEDURE — 304965 HCHG RECOVERY SERVICES

## 2020-04-14 PROCEDURE — A9270 NON-COVERED ITEM OR SERVICE: HCPCS | Performed by: OBSTETRICS & GYNECOLOGY

## 2020-04-14 PROCEDURE — 770002 HCHG ROOM/CARE - OB PRIVATE (112)

## 2020-04-14 PROCEDURE — 303615 HCHG EPIDURAL/SPINAL ANESTHESIA FOR LABOR

## 2020-04-14 PROCEDURE — A9270 NON-COVERED ITEM OR SERVICE: HCPCS | Performed by: NURSE PRACTITIONER

## 2020-04-14 PROCEDURE — 700102 HCHG RX REV CODE 250 W/ 637 OVERRIDE(OP): Performed by: OBSTETRICS & GYNECOLOGY

## 2020-04-14 RX ORDER — SODIUM CHLORIDE, SODIUM LACTATE, POTASSIUM CHLORIDE, CALCIUM CHLORIDE 600; 310; 30; 20 MG/100ML; MG/100ML; MG/100ML; MG/100ML
INJECTION, SOLUTION INTRAVENOUS PRN
Status: DISCONTINUED | OUTPATIENT
Start: 2020-04-14 | End: 2020-04-15 | Stop reason: HOSPADM

## 2020-04-14 RX ORDER — MISOPROSTOL 200 UG/1
600 TABLET ORAL
Status: DISCONTINUED | OUTPATIENT
Start: 2020-04-14 | End: 2020-04-15 | Stop reason: HOSPADM

## 2020-04-14 RX ORDER — CARBOPROST TROMETHAMINE 250 UG/ML
250 INJECTION, SOLUTION INTRAMUSCULAR
Status: DISCONTINUED | OUTPATIENT
Start: 2020-04-14 | End: 2020-04-15 | Stop reason: HOSPADM

## 2020-04-14 RX ORDER — VITAMIN A ACETATE, BETA CAROTENE, ASCORBIC ACID, CHOLECALCIFEROL, .ALPHA.-TOCOPHEROL ACETATE, DL-, THIAMINE MONONITRATE, RIBOFLAVIN, NIACINAMIDE, PYRIDOXINE HYDROCHLORIDE, FOLIC ACID, CYANOCOBALAMIN, CALCIUM CARBONATE, FERROUS FUMARATE, ZINC OXIDE, CUPRIC OXIDE 3080; 12; 120; 400; 1; 1.84; 3; 20; 22; 920; 25; 200; 27; 10; 2 [IU]/1; UG/1; MG/1; [IU]/1; MG/1; MG/1; MG/1; MG/1; MG/1; [IU]/1; MG/1; MG/1; MG/1; MG/1; MG/1
1 TABLET, FILM COATED ORAL EVERY MORNING
Status: DISCONTINUED | OUTPATIENT
Start: 2020-04-14 | End: 2020-04-15 | Stop reason: HOSPADM

## 2020-04-14 RX ORDER — METHYLERGONOVINE MALEATE 0.2 MG/ML
0.2 INJECTION INTRAVENOUS
Status: DISCONTINUED | OUTPATIENT
Start: 2020-04-14 | End: 2020-04-15 | Stop reason: HOSPADM

## 2020-04-14 RX ORDER — BISACODYL 10 MG
10 SUPPOSITORY, RECTAL RECTAL PRN
Status: DISCONTINUED | OUTPATIENT
Start: 2020-04-14 | End: 2020-04-15 | Stop reason: HOSPADM

## 2020-04-14 RX ORDER — DOCUSATE SODIUM 100 MG/1
100 CAPSULE, LIQUID FILLED ORAL 2 TIMES DAILY PRN
Status: DISCONTINUED | OUTPATIENT
Start: 2020-04-14 | End: 2020-04-15 | Stop reason: HOSPADM

## 2020-04-14 RX ADMIN — IBUPROFEN 600 MG: 600 TABLET ORAL at 21:31

## 2020-04-14 RX ADMIN — DOCUSATE SODIUM 100 MG: 100 CAPSULE, LIQUID FILLED ORAL at 00:57

## 2020-04-14 RX ADMIN — VITAMIN A, VITAMIN C, VITAMIN D-3, VITAMIN E, VITAMIN B-1, VITAMIN B-2, NIACIN, VITAMIN B-6, CALCIUM, IRON, ZINC, COPPER 1 TABLET: 4000; 120; 400; 22; 1.84; 3; 20; 10; 1; 12; 200; 27; 25; 2 TABLET ORAL at 07:18

## 2020-04-14 RX ADMIN — IBUPROFEN 600 MG: 600 TABLET ORAL at 00:57

## 2020-04-14 RX ADMIN — IBUPROFEN 600 MG: 600 TABLET ORAL at 13:22

## 2020-04-14 RX ADMIN — IBUPROFEN 600 MG: 600 TABLET ORAL at 07:18

## 2020-04-14 ASSESSMENT — EDINBURGH POSTNATAL DEPRESSION SCALE (EPDS)
I HAVE BEEN SO UNHAPPY THAT I HAVE BEEN CRYING: NO, NEVER
THE THOUGHT OF HARMING MYSELF HAS OCCURRED TO ME: NEVER
I HAVE BEEN ABLE TO LAUGH AND SEE THE FUNNY SIDE OF THINGS: AS MUCH AS I ALWAYS COULD
I HAVE BEEN SO UNHAPPY THAT I HAVE HAD DIFFICULTY SLEEPING: NOT AT ALL
I HAVE BLAMED MYSELF UNNECESSARILY WHEN THINGS WENT WRONG: NO, NEVER
I HAVE FELT SCARED OR PANICKY FOR NO GOOD REASON: NO, NOT AT ALL
I HAVE FELT SAD OR MISERABLE: NO, NOT AT ALL
I HAVE BEEN ANXIOUS OR WORRIED FOR NO GOOD REASON: NO, NOT AT ALL
THINGS HAVE BEEN GETTING ON TOP OF ME: NO, I HAVE BEEN COPING AS WELL AS EVER
I HAVE LOOKED FORWARD WITH ENJOYMENT TO THINGS: AS MUCH AS I EVER DID

## 2020-04-14 NOTE — CARE PLAN
Problem: Altered physiologic condition related to immediate post-delivery state and potential for bleeding/hemorrhage  Goal: Patient physiologically stable as evidenced by normal lochia, palpable uterine involution and vital signs within normal limits  Outcome: PROGRESSING AS EXPECTED  Note: Fundus firm, lochia light rubra. Reviewed lochia changes with the pt.     Problem: Alteration in comfort related to episiotomy, vaginal repair and/or after birth pains  Goal: Patient verbalizes acceptable pain level  Outcome: PROGRESSING AS EXPECTED  Note: Reviewed 0-10 pain scale and available pain medication with the pt.

## 2020-04-14 NOTE — ANESTHESIA PROCEDURE NOTES
Epidural Block  Date/Time: 4/13/2020 5:44 PM  Performed by: Jose Alejandro Guido M.D.  Authorized by: Jose Alejandro Guido M.D.     Patient Location:  OB  Start Time:  4/13/2020 5:44 PM  End Time:  4/13/2020 5:59 PM  Reason for Block: labor analgesia    patient identified, IV checked, site marked, risks and benefits discussed, surgical consent, monitors and equipment checked, pre-op evaluation and timeout performed    Patient Position:  Sitting  Prep: ChloraPrep, patient draped and sterile technique    Monitoring:  Blood pressure, continuous pulse oximetry and heart rate  Approach:  Midline  Location:  L4-L5  Injection Technique:  IMANI saline  Skin infiltration:  Lidocaine  Strength:  1%  Dose:  3ml  Needle Type:  Tuohy  Needle Gauge:  17 G  Needle Length:  3.5 in  Loss of resistance::  5.5  Catheter Size:  19 G  Catheter at Skin Depth:  12  Test Dose Result:  Negative

## 2020-04-14 NOTE — ANESTHESIA TIME REPORT
Anesthesia Start and Stop Event Times     Date Time Event    4/13/2020 1741 Anesthesia Start     1742 Ready for Procedure    4/14/2020 0016 Anesthesia Stop        Responsible Staff  04/13/20 to 04/14/20    Name Role Begin End    Jose Alejandro Guido M.D. Anesth 1741 0016        Preop Diagnosis (Free Text):  Pre-op Diagnosis             Preop Diagnosis (Codes):    Post op Diagnosis  Intrauterine pregnancy      Premium Reason  A. 3PM - 7AM    Comments:

## 2020-04-14 NOTE — LACTATION NOTE
This note was copied from a baby's chart.  Physical assessment of baby and mother provided. Observation of mother breastfeeding at this time to include position, skin to skin and normal  feeding patterns and expectations.

## 2020-04-14 NOTE — PROGRESS NOTES
S: Pt reports window of pain in LLQ.      O:    Vitals:    04/13/20 2157 04/13/20 2217 04/13/20 2237 04/13/20 2258   BP: 116/58 113/66 119/68 126/72   Pulse: (!) 54 62 65 69   Resp:       Temp:       TempSrc:       SpO2:       Weight:       Height:               FHTs:  Baseline 125, + accels, variable decels, minimal variability        [place]: Contractions q 2 minutes, firm to palpation, pitocin @ 14 milliunits        SVE: anterior lip/100/0     A/P:  1.  IUP @ 39w1d            2.  Cat 2 FHTs             3.  Repostion             4.  Will reassess as indicated    Berenice Manuel CNM, APRN

## 2020-04-14 NOTE — PROGRESS NOTES
IV pitocin, frequent position changes, and peanut ball to facilitate labor progression. AROM at 2150 clear fluid. Pt completely dilated at 0000. Began pushing at 0010. Baby girl born vaginally at 0016. APGARs 8/9. EBL 250cc. Bleeding WDL. Plan to transfer pt to postpartum after initial recovery period.

## 2020-04-14 NOTE — ANESTHESIA PREPROCEDURE EVALUATION
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

## 2020-04-14 NOTE — PROGRESS NOTES
Received patient from labor and delivery via wheelchair with Melba LEARY.  Assessment done. Fundus firm. Lochia light. Instructed patient to increase fluid intake and to void as needed and to watch for increased bleeding. Patient states pain at a 3 and tolerable at this time. Pt requesting to breastfeed baby. Latch observed. Pt able to latch baby on without assistance. Call light within reach. Will continue to monitor VS.

## 2020-04-14 NOTE — PROGRESS NOTES
S: Pt remains comfortable with epidural.      O:    Vitals:    04/13/20 2018 04/13/20 2037 04/13/20 2058 04/13/20 2118   BP: 120/67 114/66 112/68 113/71   Pulse: 73 67 66 73   Resp:       Temp:       TempSrc:       SpO2:       Weight:       Height:               FHTs:  Baseline 130, + accels, - decels, moderate variability        [place]: Contractions q 3-4 minutes, firm to palpation, pitocin @ 13 milliunits        SVE: 4-5/100/-1     A/P:  1.  IUP @ 39w1d            2.  Cat 1 FHTs             3.  AROM-clear             4.  Continue with pitocin           5.  Will reassess in 3 hours or as indicated    Berenice Manuel CNM, APRN

## 2020-04-14 NOTE — PROCEDURES
SPONTANEOUS VAGINAL DELIVERY PROCEDURE NOTE    PATIENT ID:  NAME:  Tiffanie Fong  MRN:               [id number]  YOB: 1985    Labor Course: Patient was admitted to Labor and Delivery at 39w2d for elective induction of labor.     Pt recd one dose of prostin gel upon admission. Pitocin was begun 4 hours hours later. Pt recd epidural for pain. AROM clear at 2150. Labor progressed to complete at 0000 with effective pushing efforts.    On 2020  at 00:16, this 34 y.o., now  39w2d , GBS negative female delivered via  under epidural anesthesia a viable female infant weight pending skin to skin transition with APGAR scores of 8 and 9 at one and five minutes. The infant head delivered direct OA with rotation to LOT. There was no nuchal cord and the anterior shoulder and rest of body delivered with slight downward traction.  The vigorous infant was placed on mothers abdomen immediately upon delivery. Cord was doubly clamped, cut and infant  was repositioned to mothers chest. An intact placenta was delivered spontaneously at 00:21 with 3 vessel cord. Upon vaginal exam, there was an intact perineum noted. Estimated blood loss was <300cc. Patient will be transferred to postpartum in stable condition and infant to  nursery.      Delivery attended by Berenice Manuel CNM, APRN. Dr. Brady attending physician on duty and available.

## 2020-04-14 NOTE — ANESTHESIA QCDR
2019 [facility] Clinical Data Registry (for Quality Improvement)     Postoperative nausea/vomiting risk protocol (Adult = 18 yrs and Pediatric 3-17 yrs)- (430 and 463)  General inhalation anesthetic (NOT TIVA) with PONV risk factors: No  Provision of anti-emetic therapy with at least 2 different classes of agents: N/A  Patient DID NOT receive anti-emetic therapy and reason is documented in Medical Record: N/A    Multimodal Pain Management- (477)  Non-emergent surgery AND patient age >= 18: No  Use of Multimodal Pain Management, two or more drugs and/or interventions, NOT including systemic opioids:   Exception: Documented allergy to multiple classes of analgesics:     Smoking Abstinence (404)  Patient is current smoker (cigarette, pipe, e-cig, marijuanna): No  Elective Surgery:   Abstinence instructions provided prior to day of surgery:   Patient abstained from smoking on day of surgery:     Pre-Op Beta-Blocker in Isolated CABG (44)  Isolated CABG AND patient age >= 18: No  Beta-blocker admin within 24 hours of surgical incision:   Exception:of medical reason(s) for not administering beta blocker within 24 hours prior to surgical incision (e.g., not  indicated,other medical reason):     PACU assessment of acute postoperative pain prior to Anesthesia Care End- Applies to Patients Age = 18- (ABG7)  Initial PACU pain score is which of the following: < 7/10  Patient unable to report pain score: N/A    Post-anesthetic transfer of care checklist/protocol to PACU/ICU- (426 and 427)  Upon conclusion of case, patient transferred to which of the following locations: PACU/Non-ICU  Use of transfer checklist/protocol: Yes  Exclusion: Service Performed in Patient Hospital Room (and thus did not require transfer): N/A  Unplanned admission to ICU related to anesthesia service up through end of PACU care- (MD51)  Unplanned admission to ICU (not initially anticipated at anesthesia start time): No

## 2020-04-14 NOTE — PROGRESS NOTES
Assumed care at 1845. IV pitocin infusing at 8mU/min at start of shift, but flowsheet information did not reflect current rate. IV pitocin increased to 10mU/min at 1900 per protocol.

## 2020-04-14 NOTE — PROGRESS NOTES
1730 At pt's bedside; bolus for epidural complete. Dr. uGido notified; pt positioned sitting up on edge of bed  1740 Dr. Guido at bedside, epidural placed without incidence. Pt tolerated procedure well.  1810 Pt resting comfortably.

## 2020-04-14 NOTE — PROGRESS NOTES
Assessment done. Pt.recently medicated for cramping.Fundus firm.Lochia light. Fob at bedside,supportive.Pt. Caring for baby with good skill. Breastfeeding going well.Pt. Has received information on education .plan of care for today discussed.

## 2020-04-14 NOTE — PROGRESS NOTES
Pt up to bathroom with standby assist. Pt able to void. Pt transferred to postpartum room 316 via wheelchair at 0325. Infant transferred via mother's arms. PT and  stable at time of transfer. Bedside report to Laura LEARY. ID bands compared with receiving RN.

## 2020-04-14 NOTE — CARE PLAN
Problem: Potential for postpartum infection related to presence of episiotomy/vaginal tear and/or uterine contamination  Goal: Patient will be absent from signs and symptoms of infection  Outcome: PROGRESSING AS EXPECTED  Note: Patient vitals stable.temp wnl. Patient has no fever or chills. Patient instructed in pericare with daisy bottle and q3-4 hour pad changes. Cbc shows no signs of infection.      Problem: Potential knowledge deficit related to lack of understanding of self and  care  Goal: Patient will demonstrate ability to care for self and infant  Outcome: PROGRESSING AS EXPECTED  Note: Patient demonstrates skills in baby care and self care. Patient able to feed,change diapers and comfort baby. Patient demonstrates good hygiene for self and baby.

## 2020-04-14 NOTE — ANESTHESIA POSTPROCEDURE EVALUATION
Patient: Tiffanie Fong    Procedure Summary     Date:  04/13/20 Room / Location:      Anesthesia Start:  1741 Anesthesia Stop:  04/14/20 0016    Procedure:  Labor Epidural Diagnosis:      Scheduled Providers:   Responsible Provider:  Jose Alejandro Guido M.D.    Anesthesia Type:  epidural ASA Status:  2          Final Anesthesia Type: epidural  Last vitals  BP   Blood Pressure: 114/67    Temp   36.9 °C (98.4 °F)    Pulse   Pulse: 75   Resp   18    SpO2   95 %      Anesthesia Post Evaluation     Nurse Pain Score: 3 (NPRS)

## 2020-04-14 NOTE — PROGRESS NOTES
S: In to introduce self to patient and  (Alec). Pt is comfortable with epidural, reports only occasional fundal pressure.      O:    Vitals:    04/13/20 1833 04/13/20 1836 04/13/20 1857 04/13/20 1909   BP: 126/66 125/61 125/61 119/65   Pulse: 68 80 80 78   Resp:   18    Temp:   36.1 °C (96.9 °F)    TempSrc:   Temporal    SpO2:       Weight:       Height:               FHTs:  Baseline 120, + accels, - decels, moderate variability        [place]: Contractions q 2-4 minutes, moderate to palpation, pitocin @ 10 milliunits        SVE: deferred     A/P:  1.  IUP @ 39w1d            2.  Cat 1 FHTs             3.  Continue with pitocin, consider IUPC            4.  Will reassess as indicated    Berenice Manuel CNM, APRN

## 2020-04-15 VITALS
HEART RATE: 82 BPM | WEIGHT: 200 LBS | TEMPERATURE: 97.9 F | OXYGEN SATURATION: 98 % | RESPIRATION RATE: 18 BRPM | DIASTOLIC BLOOD PRESSURE: 66 MMHG | HEIGHT: 67 IN | SYSTOLIC BLOOD PRESSURE: 119 MMHG | BODY MASS INDEX: 31.39 KG/M2

## 2020-04-15 PROCEDURE — A9270 NON-COVERED ITEM OR SERVICE: HCPCS | Performed by: NURSE PRACTITIONER

## 2020-04-15 PROCEDURE — 700102 HCHG RX REV CODE 250 W/ 637 OVERRIDE(OP): Performed by: NURSE PRACTITIONER

## 2020-04-15 PROCEDURE — A9270 NON-COVERED ITEM OR SERVICE: HCPCS | Performed by: OBSTETRICS & GYNECOLOGY

## 2020-04-15 PROCEDURE — 700102 HCHG RX REV CODE 250 W/ 637 OVERRIDE(OP): Performed by: OBSTETRICS & GYNECOLOGY

## 2020-04-15 PROCEDURE — 700112 HCHG RX REV CODE 229: Performed by: NURSE PRACTITIONER

## 2020-04-15 RX ORDER — IBUPROFEN 600 MG/1
600 TABLET ORAL EVERY 6 HOURS PRN
Qty: 30 TAB | Refills: 0 | Status: SHIPPED | OUTPATIENT
Start: 2020-04-15 | End: 2022-05-03

## 2020-04-15 RX ADMIN — VITAMIN A, VITAMIN C, VITAMIN D-3, VITAMIN E, VITAMIN B-1, VITAMIN B-2, NIACIN, VITAMIN B-6, CALCIUM, IRON, ZINC, COPPER 1 TABLET: 4000; 120; 400; 22; 1.84; 3; 20; 10; 1; 12; 200; 27; 25; 2 TABLET ORAL at 09:13

## 2020-04-15 RX ADMIN — IBUPROFEN 600 MG: 600 TABLET ORAL at 10:38

## 2020-04-15 RX ADMIN — IBUPROFEN 600 MG: 600 TABLET ORAL at 04:03

## 2020-04-15 RX ADMIN — DOCUSATE SODIUM 100 MG: 100 CAPSULE, LIQUID FILLED ORAL at 09:13

## 2020-04-15 NOTE — CARE PLAN
Problem: Skin Integrity  Goal: Risk for impaired skin integrity will decrease  Outcome: PROGRESSING AS EXPECTED  Note: Patient is maintaining proper nutrition and is ambulating frequently. Patient is progressing as expected and will continue to monitor for skin breakdown.      Problem: Alteration in comfort related to episiotomy, vaginal repair and/or after birth pains  Goal: Patient is able to ambulate, care for self and infant  Outcome: PROGRESSING AS EXPECTED  Note: Patient is able to get up and move around. Patient demonstrates ability to provide care for herself and for infant. Patient feeding at scheduled times and assisting with infant care. Patient is progressing as expected.

## 2020-04-15 NOTE — DISCHARGE INSTRUCTIONS
POSTPARTUM DISCHARGE INSTRUCTIONS FOR MOM    YOB: 1985   Age: 34 y.o.               Admit Date: 4/13/2020     Discharge Date: 4/15/2020  Attending Doctor:  Padmaja Brady, *                  Allergies:  Acetaminophen and Pcn [penicillins]    Discharged to home by car. Discharged via wheelchair, hospital escort: Yes.  Special equipment needed: Not Applicable  Belongings with: Personal  Be sure to schedule a follow-up appointment with your primary care doctor or any specialists as instructed.     Discharge Plan:   Diet Plan: Discussed  Activity Level: Discussed  Confirmed Follow up Appointment: Patient to Call and Schedule Appointment  Confirmed Symptoms Management: Discussed  Medication Reconciliation Updated: Yes  Influenza Vaccine Indication: Not indicated: Previously immunized this influenza season and > 8 years of age    REASONS TO CALL YOUR OBSTETRICIAN:  1.   Persistent fever or shaking chills (Temperature higher than 100.4)  2.   Heavy bleeding (soaking more than 1 pad per hour); Passing clots  3.   Foul odor from vagina  4.   Mastitis (Breast infection; breast pain, chills, fever, redness)  5.   Urinary pain, burning or frequency  7.   Severe depression longer than 24 hours  Pelvic rest for 6 weeks, nothing in the vagina (e.g. Tampons, douching, intercourse, etc)   No soaking in water for 6 weeks (e.g. Bathtubs, hot tubs, swimming)   Continue to take your prenatal vitamins while breastfeeding   Follow up with your obstetric provider in 5 weeks   See your Obstetic provider or go to ER for increase in vaginal bleeding, foul smelling discharge, worsening abdominal pain, fevers or other concerning symptoms  HAND WASHING  · Prior to handling the baby.  · Before breastfeeding or bottle feeding baby.  · After using the bathroom or changing the baby's diaper.        · Do NOT lift anything heavier than your baby for up to 6 weeks  VAGINAL CARE  · Nothing inside vagina for 6 weeks: no sexual  "intercourse, tampons or douching.  · Bleeding may continue for 2-4 weeks.  Amount may vary.    · Call your physician for heavy bleeding which means soaking more than 1 pad per hour    BIRTH CONTROL  · It is possible to become pregnant at any time after delivery and while breastfeeding.  · Plan to discuss a method of birth control with your physician at your follow up visit. visit.    DIET AND ELIMINATION  · Eating more fiber (bran cereal, fruits, and vegetables) and drinking plenty of fluids will help to avoid constipation.  · Urinary frequency after childbirth is normal.    POSTPARTUM BLUES  During the first few days after birth, you may experience a sense of the \"blues\" which may include impatience, irritability or even crying.  These feeling come and go quickly.  However, as many as 1 in 10 women experience emotional symptoms known as postpartum depression.    Postpartum depression:  May start as early as the second or third day after delivery or take several weeks or months to develop.  Symptoms of \"blues\" are present, but are more intense:  Crying spells; loss of appetite; feelings of hopelessness or loss of control; fear of touching the baby; over concern or no concern at all about the baby; little or no concern about your own appearance/caring for yourself; and/or inability to sleep or excessive sleeping.  Contact your physician if you are experiencing any of these symptoms.    Crisis Hotline:  · [place] Crisis Hotline:  3-669-BSDQQOO  Or 1-207.512.5942  · [place] Crisis Hotline:  1-937.789.9065  Or 851-013-8071    PREVENTING SHAKEN BABY:  If you are angry or stressed, PUT THE BABY IN THE CRIB, step away, take some deep breaths, and wait until you are calm to care for the baby.  DO NOT SHAKE THE BABY.  You are not alone, call a supporter for help.    · Crisis Call Center 24/7 crisis line 610-063-3015 or 1-970.669.9308  · You can also text them, text \"ANSWER\" to 101212    QUIT SMOKING/TOBACCO USE:  I " understand the use of any tobacco products increases my chance of suffering from future heart disease and could cause other illnesses which may shorten my life. Quitting the use of tobacco products is the single most important thing I can do to improve my health. For further information on smoking / tobacco cessation call a Toll Free Quit Line at 1-257.934.3326 (*[employer] [place]) or 1-303.247.2870 (American Lung Association) or you can access the web based program at www.lungusa.org.    · [place] Tobacco Users Help Line:  (493) 748-9369       Toll Free: 1-839.921.2742  · Quit Tobacco Program [facility] (216)762-1970    DEPRESSION / SUICIDE RISK:  As you are discharged from this [facility], it is important to learn how to keep safe from harming yourself.    Recognize the warning signs:  · Abrupt changes in personality, positive or negative- including increase in energy   · Giving away possessions  · Change in eating patterns- significant weight changes-  positive or negative  · Change in sleeping patterns- unable to sleep or sleeping all the time   · Unwillingness or inability to communicate  · Depression  · Unusual sadness, discouragement and loneliness  · Talk of wanting to die  · Neglect of personal appearance   · Rebelliousness- reckless behavior  · Withdrawal from people/activities they love  · Confusion- inability to concentrate     If you or a loved one observes any of these behaviors or has concerns about self-harm, here's what you can do:  · Talk about it- your feelings and reasons for harming yourself  · Remove any means that you might use to hurt yourself (examples: pills, rope, extension cords, firearm)  · Get professional help from the community (Mental Health, Substance Abuse, psychological counseling)  · Do not be alone:Call your Safe Contact- someone whom you trust who will be there for you.  · Call your local CRISIS HOTLINE 760-0743 or 806-341-0622  · Call  your local Children's Mobile Crisis Response Team [place] (976) 634-7329 or www.[employer]  · Call the toll free National Suicide Prevention Hotlines   · National Suicide Prevention Lifeline 537-234-LNNK (0258)  · National Hope Line Network 800-SUICIDE (637-3163)    DISCHARGE SURVEY:  Thank you for choosing [facility].  We hope we provided you with very good care.  You may be receiving a survey in the mail.  Please fill it out.  Your opinion is valuable to us.    ADDITIONAL EDUCATIONAL MATERIALS GIVEN TO PATIENT:        My signature on this form indicates that:  1.  I have reviewed and understand the above information  2.  My questions regarding this information have been answered to my satisfaction.  3.  I have formulated a plan with my discharge nurse to obtain my prescribed medication for home.

## 2020-04-15 NOTE — PROGRESS NOTES
Received report from Joyce LEARY. Assumed patient care. Patient rated pain 3. All questions and concerns answered at this time. Will continue to monitor. Bed rails up x 2, call light within reach.

## 2020-04-15 NOTE — LACTATION NOTE
This note was copied from a baby's chart.  Baby 39.2 weeks, , couplet to be discharged today. Mother reports she  her first baby for 1 year without problems. Mother has baby independently at breast, observed deep latch with coordinated suck & swallow- see latch assessment score.     Mother has been using a pacifier, discussed limited use. Teaching on hunger cues, breastfeeding on cue or by 3-4 hours from last feed, importance of skin to skin & cluster feeding.     Contact information for OP lactation provided.    Breastfeeding plan:  Breastfeed on cue or by 3-4 hours from last feed. F/U with OP lactation for breastfeeding support.

## 2020-04-15 NOTE — DISCHARGE SUMMARY
Discharge Summary:      Tiffanie Fong      Admit Date:   2020  Discharge Date:  4/15/2020     Admitting diagnosis:  Pregnancy  Encounter for elective induction of labor  Discharge Diagnosis: Status post   Pregnancy Complications: No  Tubal Ligation:  No         History:  Past Medical History:   Diagnosis Date   • Infectious disease      OB History    Para Term  AB Living   2 2 2     2   SAB TAB Ectopic Molar Multiple Live Births           0 2      # Outcome Date GA Lbr Jorge/2nd Weight Sex Delivery Anes PTL Lv   2 Term 20 39w2d 02:10 :16 2.945 kg (6 lb 7.9 oz) F Vag-Spont EPI N SADI   1 Term 18 39w0d  3.175 kg (7 lb) F Vag-Spont   SADI        Acetaminophen and Pcn [penicillins]  Patient Active Problem List    Diagnosis Date Noted   • 37 weeks gestation of pregnancy 2020   • Encounter for supervision of other normal pregnancy, third trimester 2019   • Anemia 10/01/2018   • History of sarcoma 2018   • 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 , now , was admitted with the above mentioned diagnosis, underwent elective IOL and . Patient postpartum course was unremarkable, with progressive advancement in diet, ambulation and toleration of oral analgesia. Patient without complaints today and desires discharge.      Abdominal pain: Mild  Ambulating: Yes  tolerating liquids: Yes  tolerating regular diet: Yes  Flatus: Yes  BM: No  Bleeding: Mild  Voiding: Yes  Dizziness: No  Breast feeding: Yes  Breast tenderness: No    Vitals:    20 0556 20 1000 20 1400 20 1800   BP: 114/67 115/69 114/64 112/66   Pulse: 75 88 78 72   Resp:    Temp: 36.9 °C (98.4 °F) 36.9 °C (98.4 °F) 36.4 °C (97.5 °F) 36.1 °C (97 °F)   TempSrc: Temporal Temporal Temporal Temporal   SpO2: 95% 96% 98% 97%   Weight:       Height:            Current Facility-Administered Medications   Medication Dose   • LR infusion     • PRN oxytocin (PITOCIN) (20 Units/1000 mL) PRN for excessive uterine bleeding - See Admin Instr  125-999 mL/hr   • miSOPROStol (CYTOTEC) tablet 600 mcg  600 mcg   • methylergonovine (METHERGINE) injection 0.2 mg  0.2 mg   • carboPROST (HEMABATE) injection 250 mcg  250 mcg   • docusate sodium (COLACE) capsule 100 mg  100 mg   • bisacodyl (DULCOLAX) suppository 10 mg  10 mg   • magnesium hydroxide (MILK OF MAGNESIA) suspension 30 mL  30 mL   • prenatal plus vitamin (STUARTNATAL 1+1) 27-1 MG tablet 1 Tab  1 Tab   • ondansetron (ZOFRAN ODT) dispertab 4 mg  4 mg    Or   • ondansetron (ZOFRAN) syringe/vial injection 4 mg  4 mg   • oxytocin (PITOCIN) infusion (for postpartum)   mL/hr   • ibuprofen (MOTRIN) tablet 600 mg  600 mg   • oxyCODONE-acetaminophen (PERCOCET) 5-325 MG per tablet 1 Tab  1 Tab   • acetaminophen (TYLENOL) tablet 325 mg  325 mg   • oxyCODONE immediate release (ROXICODONE) tablet 10 mg  10 mg       Exam:  Vitals:    04/14/20 0556 04/14/20 1000 04/14/20 1400 04/14/20 1800   BP: 114/67 115/69 114/64 112/66   Pulse: 75 88 78 72   Resp: 18 20 20 18   Temp: 36.9 °C (98.4 °F) 36.9 °C (98.4 °F) 36.4 °C (97.5 °F) 36.1 °C (97 °F)   TempSrc: Temporal Temporal Temporal Temporal   SpO2: 95% 96% 98% 97%   Weight:       Height:         General: No acute distress, resting comfortably in bed.  HEENT: normocephalic, nontraumatic, EOMI  Cardiovascular: Heart RRR with no murmurs, rubs or gallops. Distal Pulses 2+  Respiratory: symmetric chest expansion, lungs CTA bilaterally with no wheezes rales or rhonci  Abdomen: soft, non-tender, dry and intact, fundus firm, +BS  Genitourinary: lochia light, denies excessive vaginal bleeding  Musculoskeletal: strength 5/5 in four extremities, no calf tenderness  Neuro: no focal deficits noted       Labs:  Recent Labs     04/13/20  1010 04/14/20  0836   WBC 10.2 12.3*   RBC 3.99* 3.51*    HEMOGLOBIN 11.5* 10.1*   HEMATOCRIT 34.6* 30.2*   MCV 86.7 86.0   MCH 28.8 28.8   MCHC 33.2* 33.4*   RDW 41.6 42.1   PLATELETCT 272 237   MPV 10.3 10.4        Activity:   Discharge to home  Pelvic Rest x 6 weeks    Assessment:  normal postpartum course     Follow up: .  [facility] or [facility] in 5 weeks for post partum follow up; 1 week for incision check.   To resume daily PNV and iron supplement if needed with hydration.     Patient to return to TPC or ER if any of the following occur:   Fever over 100.5   Severe abdominal pain   Red streaks or painful masses in the breasts   Foul smelling discharge or lochia   Heavy vaginal bleeding saturating a pad per hour   S/s of PP depression     Discharge Meds:      Medication List      START taking these medications      Instructions   ibuprofen 600 MG Tabs  Commonly known as:  MOTRIN   Take 1 Tab by mouth every 6 hours as needed (For cramping after delivery; do not give if patient is receiving ketorolac (Toradol)).  Dose:  600 mg        CONTINUE taking these medications      Instructions   PRENATAL 1+1 PO   Take  by mouth.     Vitamin C 1000 MG Tabs   Take  by mouth.        STOP taking these medications    diclofenac epolamine 1.3 % Ptch  Commonly known as:  FLECTOR     metoclopramide 10 MG Tabs  Commonly known as:  REGLAN     ondansetron 4 MG Tabs tablet  Commonly known as:  SONIA Looney M.D.

## 2020-04-24 ENCOUNTER — HOSPITAL ENCOUNTER (OUTPATIENT)
Dept: LAB | Facility: MEDICAL CENTER | Age: 35
End: 2020-04-24
Attending: PHYSICIAN ASSISTANT
Payer: OTHER GOVERNMENT

## 2020-04-24 DIAGNOSIS — Z76.89 ESTABLISHING CARE WITH NEW DOCTOR, ENCOUNTER FOR: ICD-10-CM

## 2020-04-24 LAB
ALBUMIN SERPL BCP-MCNC: 4.4 G/DL (ref 3.2–4.9)
ALBUMIN/GLOB SERPL: 1.5 G/DL
ALP SERPL-CCNC: 99 U/L (ref 30–99)
ALT SERPL-CCNC: 14 U/L (ref 2–50)
ANION GAP SERPL CALC-SCNC: 16 MMOL/L (ref 7–16)
AST SERPL-CCNC: 16 U/L (ref 12–45)
BILIRUB SERPL-MCNC: 0.6 MG/DL (ref 0.1–1.5)
BUN SERPL-MCNC: 13 MG/DL (ref 8–22)
CALCIUM SERPL-MCNC: 9 MG/DL (ref 8.4–10.2)
CHLORIDE SERPL-SCNC: 104 MMOL/L (ref 96–112)
CHOLEST SERPL-MCNC: 357 MG/DL (ref 100–199)
CO2 SERPL-SCNC: 21 MMOL/L (ref 20–33)
CREAT SERPL-MCNC: 0.74 MG/DL (ref 0.5–1.4)
FASTING STATUS PATIENT QL REPORTED: NORMAL
GLOBULIN SER CALC-MCNC: 2.9 G/DL (ref 1.9–3.5)
GLUCOSE SERPL-MCNC: 78 MG/DL (ref 65–99)
HDLC SERPL-MCNC: 84 MG/DL
LDLC SERPL CALC-MCNC: 241 MG/DL
POTASSIUM SERPL-SCNC: 4 MMOL/L (ref 3.6–5.5)
PROT SERPL-MCNC: 7.3 G/DL (ref 6–8.2)
SODIUM SERPL-SCNC: 141 MMOL/L (ref 135–145)
T4 FREE SERPL-MCNC: 0.98 NG/DL (ref 0.58–1.64)
TRIGL SERPL-MCNC: 161 MG/DL (ref 0–149)
TSH SERPL DL<=0.005 MIU/L-ACNC: 1.44 UIU/ML (ref 0.38–5.33)

## 2020-04-24 PROCEDURE — 84443 ASSAY THYROID STIM HORMONE: CPT

## 2020-04-24 PROCEDURE — 80053 COMPREHEN METABOLIC PANEL: CPT

## 2020-04-24 PROCEDURE — 84439 ASSAY OF FREE THYROXINE: CPT

## 2020-04-24 PROCEDURE — 82306 VITAMIN D 25 HYDROXY: CPT

## 2020-04-24 PROCEDURE — 36415 COLL VENOUS BLD VENIPUNCTURE: CPT

## 2020-04-24 PROCEDURE — 80061 LIPID PANEL: CPT

## 2020-04-25 LAB — 25(OH)D3 SERPL-MCNC: 32 NG/ML (ref 30–100)

## 2020-05-14 ASSESSMENT — EDINBURGH POSTNATAL DEPRESSION SCALE (EPDS)
I HAVE BEEN SO UNHAPPY THAT I HAVE HAD DIFFICULTY SLEEPING: NOT AT ALL
I HAVE BEEN ABLE TO LAUGH AND SEE THE FUNNY SIDE OF THINGS: AS MUCH AS I ALWAYS COULD
THINGS HAVE BEEN GETTING ON TOP OF ME: NO, I HAVE BEEN COPING AS WELL AS EVER
I HAVE LOOKED FORWARD WITH ENJOYMENT TO THINGS: AS MUCH AS I EVER DID
I HAVE FELT SCARED OR PANICKY FOR NO GOOD REASON: NO, NOT AT ALL
I HAVE BEEN ANXIOUS OR WORRIED FOR NO GOOD REASON: NO, NOT AT ALL
I HAVE FELT SAD OR MISERABLE: NO, NOT AT ALL
THE THOUGHT OF HARMING MYSELF HAS OCCURRED TO ME: NEVER
I HAVE BEEN SO UNHAPPY THAT I HAVE BEEN CRYING: NO, NEVER
TOTAL SCORE: 1
I HAVE BLAMED MYSELF UNNECESSARILY WHEN THINGS WENT WRONG: NOT VERY OFTEN

## 2020-05-19 ENCOUNTER — POST PARTUM (OUTPATIENT)
Dept: OBGYN | Facility: CLINIC | Age: 35
End: 2020-05-19
Payer: OTHER GOVERNMENT

## 2020-05-19 VITALS — DIASTOLIC BLOOD PRESSURE: 85 MMHG | WEIGHT: 188.1 LBS | BODY MASS INDEX: 29.46 KG/M2 | SYSTOLIC BLOOD PRESSURE: 125 MMHG

## 2020-05-19 PROCEDURE — 90050 PR POSTPARTUM VISIT: CPT | Performed by: OBSTETRICS & GYNECOLOGY

## 2020-05-19 RX ORDER — ACETAMINOPHEN AND CODEINE PHOSPHATE 120; 12 MG/5ML; MG/5ML
1 SOLUTION ORAL DAILY
Qty: 90 TAB | Refills: 3 | Status: SHIPPED | OUTPATIENT
Start: 2020-05-19 | End: 2021-09-06

## 2020-05-19 ASSESSMENT — FIBROSIS 4 INDEX: FIB4 SCORE: 0.58

## 2020-05-19 ASSESSMENT — EDINBURGH POSTNATAL DEPRESSION SCALE (EPDS)
THINGS HAVE BEEN GETTING ON TOP OF ME: NO, I HAVE BEEN COPING AS WELL AS EVER
I HAVE FELT SCARED OR PANICKY FOR NO GOOD REASON: NO, NOT AT ALL
I HAVE BEEN SO UNHAPPY THAT I HAVE HAD DIFFICULTY SLEEPING: NOT AT ALL
I HAVE FELT SAD OR MISERABLE: NO, NOT AT ALL
I HAVE BEEN ANXIOUS OR WORRIED FOR NO GOOD REASON: NO, NOT AT ALL
THE THOUGHT OF HARMING MYSELF HAS OCCURRED TO ME: NEVER
I HAVE BLAMED MYSELF UNNECESSARILY WHEN THINGS WENT WRONG: NO, NEVER
I HAVE LOOKED FORWARD WITH ENJOYMENT TO THINGS: AS MUCH AS I EVER DID
I HAVE BEEN ABLE TO LAUGH AND SEE THE FUNNY SIDE OF THINGS: AS MUCH AS I ALWAYS COULD
TOTAL SCORE: 0
I HAVE BEEN SO UNHAPPY THAT I HAVE BEEN CRYING: NO, NEVER

## 2020-05-19 NOTE — NON-PROVIDER
Pt here today for postpartum exam.  Delivery Date 4/14/2020  Currently: Breast feeding only  BCM: BCP  LMP: not yet  Good ph:[phone]  Pharmacy verified.  Pt states no issues or complaints for today.

## 2020-05-19 NOTE — PROGRESS NOTES
Tiffanie Fong is a 34 y.o. female who presents for her Postpartum Exam      HPI: Pt presents for postpartum exam s/p vaginal, spontaneous on 4/14/2020. Patient is without complaints.  Baby doing well.  She is breast-feeding that she states that her current volume is not as high as she would like it to be for her to be pumping and going back to work.  No depression/anxiety.  Not sexually active.  Lochia resolved.  Desires pills for contraception.  EDPS score: 0  Last pap: February 2018 with her other OB practice reportedly normal    Review of Systems:   Pertinent positives documented in HPI and all other systems reviewed & are negative    Physical exam:   Vital measurements:  /85   Wt 85.3 kg (188 lb 1.6 oz)   Body mass index is 29.46 kg/m². (Goal BMI>18 <25)  Nursing note and vitals reviewed.  Gen: She is oriented to person, place, and time. She appears well-developed and well-nourished. No distress.   Heart: Heart regular rate and rhythm  Lungs: clear to auscultation bilaterally  Breasts: nontender, not engorged, no dominant masses, nipples intact  Abdomen: soft, nontender, nondistended, no rebound/guarding  Extremities: nontender, no clubbing, cyanosis or edema  Pelvic: Normal external female genitalia, well-healed perineum from delivery, cervix is normal, uterus approximately 5 weeks in size non tender, adnexa bilaterally normal with no dominant masses    A/P: Postpartum status post vaginal, spontaneous  Doing well without complaints  Continue prenatal vitamins if breast feeding  May resume normal activities including exercise, tampons, and intercourse  Discussed various methods of contraception with the patient including OCs, ring, patch, DepoProvera, IUD, Nexplanon, and tubal ligation. Also discussed barrier method and rhythm methods.   Contraception patient would prefer pills as she has had 3 IUDs spontaneously fell out.  We discussed that as her current milk volume is not as high as she would  like it to be, would prefer progesterone only contraception.  Micronor are to be taken on the same hour every day.  Discussed with patient if she misses for more than 3 hours she must use a backup contraception method.  She is to take it continuously without a break for a menses.  She may message me through my chart when she is finished breast-feeding in order to initiate LEAH's  Discussed she needs a repeat Pap smear in 2021, February

## 2020-06-09 ENCOUNTER — TELEPHONE (OUTPATIENT)
Dept: OBGYN | Facility: CLINIC | Age: 35
End: 2020-06-09

## 2020-06-09 NOTE — TELEPHONE ENCOUNTER
Geetha from [employer] lvm regarding her delivery date and delivery type. [id number]

## 2022-05-03 PROBLEM — Z34.83 ENCOUNTER FOR SUPERVISION OF OTHER NORMAL PREGNANCY IN THIRD TRIMESTER: Status: RESOLVED | Noted: 2019-09-23 | Resolved: 2022-05-03

## 2022-05-03 PROBLEM — Z3A.37 37 WEEKS GESTATION OF PREGNANCY: Status: RESOLVED | Noted: 2020-02-04 | Resolved: 2022-05-03
